# Patient Record
Sex: MALE | Race: WHITE
[De-identification: names, ages, dates, MRNs, and addresses within clinical notes are randomized per-mention and may not be internally consistent; named-entity substitution may affect disease eponyms.]

---

## 2018-04-25 ENCOUNTER — HOSPITAL ENCOUNTER (INPATIENT)
Dept: HOSPITAL 74 - YASAS | Age: 38
LOS: 4 days | Discharge: HOME | DRG: 773 | End: 2018-04-29
Attending: INTERNAL MEDICINE | Admitting: INTERNAL MEDICINE
Payer: COMMERCIAL

## 2018-04-25 VITALS — BODY MASS INDEX: 41.1 KG/M2

## 2018-04-25 DIAGNOSIS — F32.9: ICD-10-CM

## 2018-04-25 DIAGNOSIS — F10.230: Primary | ICD-10-CM

## 2018-04-25 DIAGNOSIS — E86.0: ICD-10-CM

## 2018-04-25 DIAGNOSIS — F17.210: ICD-10-CM

## 2018-04-25 DIAGNOSIS — F51.05: ICD-10-CM

## 2018-04-25 DIAGNOSIS — F11.20: ICD-10-CM

## 2018-04-25 DIAGNOSIS — F41.9: ICD-10-CM

## 2018-04-25 DIAGNOSIS — F13.230: ICD-10-CM

## 2018-04-25 DIAGNOSIS — G47.00: ICD-10-CM

## 2018-04-25 DIAGNOSIS — F19.282: ICD-10-CM

## 2018-04-25 DIAGNOSIS — E66.9: ICD-10-CM

## 2018-04-25 DIAGNOSIS — F19.24: ICD-10-CM

## 2018-04-25 LAB
APPEARANCE UR: CLEAR
BILIRUB UR STRIP.AUTO-MCNC: NEGATIVE MG/DL
COLOR UR: YELLOW
EPITH CASTS URNS QL MICRO: (no result) /HPF
KETONES UR QL STRIP: NEGATIVE
LEUKOCYTE ESTERASE UR QL STRIP.AUTO: (no result)
MUCOUS THREADS URNS QL MICRO: (no result)
NITRITE UR QL STRIP: NEGATIVE
PH UR: 7 [PH] (ref 5–8)
PROT UR QL STRIP: NEGATIVE
PROT UR QL STRIP: NEGATIVE
SP GR UR: 1.02 (ref 1–1.03)
UROBILINOGEN UR STRIP-MCNC: 2 MG/DL (ref 0.2–1)

## 2018-04-25 PROCEDURE — HZ2ZZZZ DETOXIFICATION SERVICES FOR SUBSTANCE ABUSE TREATMENT: ICD-10-PCS | Performed by: SURGERY

## 2018-04-25 PROCEDURE — G0009 ADMIN PNEUMOCOCCAL VACCINE: HCPCS

## 2018-04-25 PROCEDURE — G0008 ADMIN INFLUENZA VIRUS VAC: HCPCS

## 2018-04-25 RX ADMIN — Medication PRN MG: at 22:30

## 2018-04-25 RX ADMIN — NICOTINE SCH: 14 PATCH, EXTENDED RELEASE TRANSDERMAL at 13:25

## 2018-04-25 RX ADMIN — Medication SCH MG: at 22:31

## 2018-04-25 RX ADMIN — TRAZODONE HYDROCHLORIDE SCH MG: 50 TABLET ORAL at 22:30

## 2018-04-25 RX ADMIN — METHADONE HYDROCHLORIDE SCH MG: 40 TABLET ORAL at 13:19

## 2018-04-25 NOTE — HP
CIWA Score





- CIWA Score


Nausea/Vomiting: 3


Muscle Tremors: 3


Anxiety: 3


Agitation: 3


Paroxysmal Sweats: 3


Orientation: 0-Oriented


Tacttile Disturbances: 0-None


Auditory Disturbances: 0-None


Visual Disturbances: 0-None


Headache: 0-None Present


CIWA-Ar Total Score: 15





Admission ROS S





- HPI


Chief Complaint: 





alcohol and benzodiazepine withdrawal sx


Allergies/Adverse Reactions: 


 Allergies











Allergy/AdvReac Type Severity Reaction Status Date / Time


 


No Known Allergies Allergy   Verified 18 09:21











History of Present Illness: 





36 yo m with h/o OUD, severe, on OTP methadone 100mg daily, ldm yesterday , 

dose verified by nurse requesting inaptietn detoxifciation from alcoho and 

benzodiazepines beasue of withdrwal sx.  denies seizures, DTs, o si at this 

time.  PMHX anxiety, depression, insomnia, thirsty, 


Exam Limitations: No Limitations





- Ebola screening


Have you traveled outside of the country in the last 21 days: No


Have you had contact with anyone from an Ebola affected area: No


Have you been sick,other than usual withdrawal symptoms: No





- Review of Systems


Constitutional: Chills, Diaphoresis, Night Sweats, Changes in sleep, 

Unintentional Wgt. Loss


EENT: reports: No Symptoms Reported


Respiratory: reports: No Symptoms reported


Cardiac: reports: No Symptoms Reported


GI: reports: Nausea, Poor Appetite, Poor Fluid Intake, Vomiting, Indigestion, 

Abdominal cramping


: reports: No Symptoms Reported


Musculoskeletal: reports: No Symptoms Reported


Integumentary: reports: Flushing, Sweating


Neuro: reports: Headache, Numbness, Tingling, Tremors


Endocrine: reports: Increased Thirst


Hematology: reports: No Symptoms Reported


Psychiatric: reports: Judgement Intact, Mood/Affect Appropiate, Orientated x3, 

Anxious, Depressed


Other Systems: Reviewed and Negative





Patient History





- Patient Medical History


Hx Anemia: No


Hx Asthma: No


Hx Chronic Obstructive Pulmonary Disease (COPD): No


Hx Cancer: No


Hx Cardiac Disorders: No


Hx Congestive Heart Failure: No


Hx Hypertension: No


Hx Hypercholesterolemia: No


Hx Pacemaker: No


HX Cerebrovascular Accident: No


Hx Seizures: No


Hx Dementia: No


Hx Diabetes: No


Hx Gastrointestinal Disorders: No


Hx Liver Disease: No


Hx Genitourinary Disorders: No


Hx Sexually Transmitted Disorders: No


Hx Renal Disease (ESRD): No


Hx Thyroid Disease: No


Hx Human Immunodeficiency Virus (HIV): No


Hx Hepatitis C: No


Hx Depression: No


Hx Suicide Attempt: No (no si at htis time)


Hx Bipolar Disorder: No


Hx Schizophrenia: No


Other Medical History: takes trazadone for sleep





- Patient Surgical History


Past Surgical History: Yes


Hx Neurologic Surgery: No


Hx Cataract Extraction: No


Hx Cardiac Surgery: No


Hx Lung Surgery: No


Hx Breast Surgery: No


Hx Breast Biopsy: No


Hx Abdominal Surgery: No


Hx Appendectomy: Yes ()


Hx Cholecystectomy: No


Hx Genitourinary Surgery: No


Hx  Section: No


Hx Orthopedic Surgery: No


Anesthesia Reaction: No





- PPD History


Previous Implant?: Yes


Documented Results: Negative w/proof


Date: 11


Results: negative


PPD to be Administered?: Yes





- Reproductive History


Patient is a Female of Child Bearing Age (11 -55 yrs old): No


Patient Pregnant: No





- Smoking Cessation


Smoking history: Current every day smoker


Aproximately how many cigarettes per day: 10


Cigars Per Day: 0


Hx Chewing Tobacco Use: No


Initiated information on smoking cessation: Yes


'Breaking Loose' booklet given: 18





- Substance & Tx. History


Hx Alcohol Use: Yes


Hx Substance Use: Yes


Substance Use Type: Alcohol, Cocaine, Heroin, Opiates, Prescribed, Tranquilizers


Hx Substance Use Treatment: Yes (otp)





- Substances Abused


  ** Heroin


Route: Inhalation


Frequency: Daily


Amount used: 5 BAGS


Age of first use: 18


Date of Last Use: 18





  ** Alcohol


Route: Oral


Frequency: Daily


Amount used: 7 BEER 24 OZ


Age of first use: 22


Date of Last Use: 18





  ** Benzodiazepine (Klonopin)


Route: Oral


Frequency: Daily


Amount used: 7mg daily clonazepam daily


Age of first use: 18


Date of Last Use: 18





  ** Cocaine


Route: Inhalation


Frequency: 1-3 times last 30 days


Amount used: $150


Age of first use: 26


Date of Last Use: 18





Family Disease History





- Family Disease History


Family History: Denies





Admission Physical Exam [facility]





- Vital Signs


Vital Signs: 


 Vital Signs - 24 hr











  18





  08:59


 


Temperature 96.6 F L


 


Pulse Rate 86


 


Respiratory 20





Rate 


 


Blood Pressure 136/98














- Physical


General Appearance: Yes: Nourished, Appropriately Dressed, Disheveled, Mild 

Distress, Moderate Distress, Obese, Tremorous, Irritable, Sweating, Anxious


HEENTM: Yes: Within Normal Limits, EOMI, Hearing grossly Normal, Normal ENT 

Inspection, Normocephalic, Normal Voice, HITESH, Pharynx Normal, Nasal Congestion

, Rhinorrhea


Respiratory: Yes: Within Normal Limits, Chest Non-Tender, Lungs Clear, Normal 

Breath Sounds, No Respiratory Distress, No Accessory Muscle Use


Neck: Yes: Within Normal Limits, No masses,lesions,Nodules, Supple, Trachea in 

good position


Breast: Yes: Breast Exam Deferred


Cardiology: Yes: Within Normal Limits, Regular Rhythm, Regular Rate, S1, S2


Abdominal: Yes: Within Normal Limits, Normal Bowel Sounds, Non Tender, Soft, 

Increased Bowel Sounds, Protuberent, Distended


Genitourinary: Yes: Within Normal Limits


Back: Yes: Within Normal Limits, Normal Inspection


Musculoskeletal: Yes: Within Normal Limits, full range of Motion, Gait Steady, 

Pelvis Stable


Extremities: Yes: Normal Capillary Refill, Normal Range of Motion, Non-Tender, 

Tremors


Neurological: Yes: CNs II-XII NML intact, Fully Oriented, Alert, Motor Strength 

5/5, Normal Response, Depressed Affect


Integumentary: Yes: Normal Color, Warm, Diaphoresis, Moist


Lymphatic: Yes: Within Normal Limits





- Addiitonal


Findings: 





withdrawal sx





- Diagnostic


(1) Alcohol dependence with uncomplicated withdrawal


Current Visit: Yes   Status: Acute   





(2) Sedative, hypnotic or anxiolytic dependence with withdrawal, uncomplicated


Current Visit: Yes   Status: Acute   





(3) Opioid dependence on agonist therapy


Current Visit: Yes   Status: Acute   





(4) Insomnia


Current Visit: Yes   Status: Acute   





(5) Dehydration


Current Visit: Yes   Status: Acute   





(6) Anxiety


Current Visit: Yes   Status: Acute   





(7) Depression


Current Visit: Yes   Status: Acute   





(8) Obesity


Current Visit: Yes   Status: Acute   





Cleared for Admission [facility]





- Detox or Rehab


[facility] Level of Care: Medically Managed


Detox Regimen/Protocol: Librium





[facility] Breath Alcohol Content


Breath Alcohol Content: 0





Urine Drug Screen





- Results


Drug Screen Negative: No


Urine Drug Screen Results: BZO-Benzodiazepines, MTD-Methadone, TCA-Tricyclic 

Antidepress

## 2018-04-25 NOTE — CONSULT
[facility] Psychiatric Consult





- Data


Date of interview: 04/25/18


[facility]


Identifying data: Patient is a 37 year old [place]er male, father of six, 

unemployed, receiving food stamps, and currently homeless. This is one of 

multiple admissions for patient. Pt. admitted to  detox for alcohol, 

benzodiazepine and opiate dependence.


Substance Abuse History: Following information confirmed with Mr. Rizo:  

Smoking Cessation.  Smoking history: Current every day smoker.  Aproximately 

how many cigarettes per day: 10.  Cigars Per Day: 0.  Hx Chewing Tobacco Use: 

No.  Initiated information on smoking cessation: Yes.  'Breaking Loose' booklet 

given: 04/25/18.  - Substance & Tx. History.  Hx Alcohol Use: Yes.  Hx 

Substance Use: Yes.  Substance Use Type: Alcohol, Cocaine, Heroin, Opiates, 

Prescribed, Tranquilizers.  Hx Substance Use Treatment: Yes (otp).  - 

Substances Abused.  ** Heroin.  Route: Inhalation.  Frequency: Daily.  Amount 

used: 5 BAGS.  Age of first use: 18.  Date of Last Use: 04/21/18.  ** Alcohol.  

Route: Oral.  Frequency: Daily.  Amount used: 7 BEER 24 OZ.  Age of first use: 

22.  Date of Last Use: 04/24/18.  ** Benzodiazepine (Klonopin).  Route: Oral.  

Frequency: Daily.  Amount used: 7mg daily clonazepam daily.  Age of first use: 

18.  Date of Last Use: 04/24/18.  ** Cocaine.  Route: Inhalation.  Frequency: 1-

3 times last 30 days.  Amount used: $150.  Age of first use: 26.  Date of Last 

Use: 04/11/18


Medical History: Denies.


Psychiatric History: Patient's first encounter with a psychiatrist was in 2015 

after admission to [facility] psychiatric unit for suicidal ideation. 

Outpatient care is currently provided at the [facility] in the [place]. 

Pt is prescribed buspar 15mg BID +Trazodone 50mg + Abilify 10mg (nonadherence 

to abilify). Pt. reports a diagnosis of bipolar disorder. Pt. denies h/o 

suicide attempt.


Physical/Sexual Abuse/Trauma History: Denies.





Mental Status Exam





- Mental Status Exam


Alert and Oriented to: Time, Place, Person


Cognitive Function: Good


Patient Appearance: Well Groomed


Mood: Hopeful


Affect: Mood Congruent


Patient Behavior: Cooperative


Speech Pattern: Appropriate


Voice Loudness: Normal


Thought Process: Goal Oriented


Thought Disorder: Not Present


Hallucinations: Denies


Suicidal Ideation: Denies


Homicidal Ideation: Denies


Insight/Judgement: Poor


Sleep: Poorly


Appetite: Fair


Muscle strength/Tone: Normal


Gait/Station: Normal





Psychiatric Findings





- Problem List (Axis 1, 2,3)


(1) Substance induced mood disorder


Current Visit: Yes   Status: Acute   





(2) Alcohol dependence with uncomplicated withdrawal


Current Visit: Yes   Status: Acute   





(3) Opioid dependence on agonist therapy


Current Visit: Yes   Status: Acute   





(4) Sedative, hypnotic or anxiolytic dependence with withdrawal, uncomplicated


Current Visit: Yes   Status: Acute   





(5) Substance-induced sleep disorder


Current Visit: Yes   Status: Acute   





- Initial Treatment Plan


Initial Treatment Plan: Psychoeducation provided. Detoxification in progress. 

Buspar 15mg BID + trazodone 50mg qhs ordered. Benefits and side effects 

discusssed. Pt. made aware of the risk of priapism when accepting trazodone. 

Verbal consent given. Will continue to monitor.

## 2018-04-25 NOTE — EKG
Test Reason : 

Blood Pressure : ***/*** mmHG

Vent. Rate : 073 BPM     Atrial Rate : 073 BPM

   P-R Int : 142 ms          QRS Dur : 082 ms

    QT Int : 436 ms       P-R-T Axes : 053 037 045 degrees

   QTc Int : 480 ms

 

NORMAL SINUS RHYTHM

PROLONGED QT

ABNORMAL ECG

NO PREVIOUS ECGS AVAILABLE

Confirmed by NASH FRAGOSO, LO ([id number]) on 4/25/2018 3:31:45 PM

 

Referred By:             Confirmed By:LO CAO MD

## 2018-04-26 LAB
ALBUMIN SERPL-MCNC: 3.5 G/DL (ref 3.4–5)
ALP SERPL-CCNC: 147 U/L (ref 45–117)
ALT SERPL-CCNC: 49 U/L (ref 12–78)
ANION GAP SERPL CALC-SCNC: 5 MMOL/L (ref 8–16)
AST SERPL-CCNC: 28 U/L (ref 15–37)
BILIRUB SERPL-MCNC: 0.4 MG/DL (ref 0.2–1)
BUN SERPL-MCNC: 8 MG/DL (ref 7–18)
CALCIUM SERPL-MCNC: 8.7 MG/DL (ref 8.5–10.1)
CHLORIDE SERPL-SCNC: 106 MMOL/L (ref 98–107)
CO2 SERPL-SCNC: 30 MMOL/L (ref 21–32)
CREAT SERPL-MCNC: 0.8 MG/DL (ref 0.7–1.3)
DEPRECATED RDW RBC AUTO: 14.5 % (ref 11.9–15.9)
GLUCOSE SERPL-MCNC: 80 MG/DL (ref 74–106)
HCT VFR BLD CALC: 43.2 % (ref 35.4–49)
HGB BLD-MCNC: 14.5 GM/DL (ref 11.7–16.9)
MCH RBC QN AUTO: 30.3 PG (ref 25.7–33.7)
MCHC RBC AUTO-ENTMCNC: 33.7 G/DL (ref 32–35.9)
MCV RBC: 90.1 FL (ref 80–96)
PLATELET # BLD AUTO: 247 K/MM3 (ref 134–434)
PMV BLD: 10.4 FL (ref 7.5–11.1)
POTASSIUM SERPLBLD-SCNC: 4.5 MMOL/L (ref 3.5–5.1)
PROT SERPL-MCNC: 6.8 G/DL (ref 6.4–8.2)
RBC # BLD AUTO: 4.79 M/MM3 (ref 4–5.6)
SODIUM SERPL-SCNC: 141 MMOL/L (ref 136–145)
WBC # BLD AUTO: 7.3 K/MM3 (ref 4–10)

## 2018-04-26 RX ADMIN — IBUPROFEN PRN MG: 400 TABLET, FILM COATED ORAL at 22:22

## 2018-04-26 RX ADMIN — METHADONE HYDROCHLORIDE SCH MG: 40 TABLET ORAL at 05:29

## 2018-04-26 RX ADMIN — Medication SCH TAB: at 10:39

## 2018-04-26 RX ADMIN — Medication PRN MG: at 22:21

## 2018-04-26 RX ADMIN — TRAZODONE HYDROCHLORIDE SCH MG: 50 TABLET ORAL at 22:20

## 2018-04-26 RX ADMIN — NICOTINE SCH MG: 14 PATCH, EXTENDED RELEASE TRANSDERMAL at 10:40

## 2018-04-26 RX ADMIN — Medication SCH MG: at 22:21

## 2018-04-26 NOTE — PN
S CIWA





- CIWA Score


Nausea/Vomiting: 3


Muscle Tremors: 3


Anxiety: 3


Agitation: 3


Paroxysmal Sweats: 1-Minimal Palms Moist


Orientation: 0-Oriented


Tacttile Disturbances: 1-Very Mild Itch/Numbness


Auditory Disturbances: 1-Very Mild


Visual Disturbances: 0-None


Headache: 2-Mild


CIWA-Ar Total Score: 17





[facility] Progress Note (SOAP)


Subjective: 





ALERT,IRRITABLE,ANXIOUS,INTERRUPTED SLEEP,TREMOR,PAIN IN THE BODY AND BACK


Objective: 





04/26/18 10:27


 Vital Signs











Temperature  97.7 F   04/26/18 10:03


 


Pulse Rate  67   04/26/18 10:03


 


Respiratory Rate  16   04/26/18 10:03


 


Blood Pressure  121/69   04/26/18 10:03


 


O2 Sat by Pulse Oximetry (%)      








EKG NSR,PROLOMG /480


NO CHEST PAIN,NO SOB,NO DIZZINESS


 Laboratory Last Values











WBC  7.3 K/mm3 (4.0-10.0)   04/26/18  05:50    


 


RBC  4.79 M/mm3 (4.00-5.60)   04/26/18  05:50    


 


Hgb  14.5 GM/dL (11.7-16.9)   04/26/18  05:50    


 


Hct  43.2 % (35.4-49)   04/26/18  05:50    


 


MCV  90.1 fl (80-96)   04/26/18  05:50    


 


MCH  30.3 pg (25.7-33.7)   04/26/18  05:50    


 


MCHC  33.7 g/dl (32.0-35.9)   04/26/18  05:50    


 


RDW  14.5 % (11.9-15.9)   04/26/18  05:50    


 


Plt Count  247 K/MM3 (134-434)   04/26/18  05:50    


 


MPV  10.4 fl (7.5-11.1)   04/26/18  05:50    


 


Urine Color  Yellow   04/25/18  21:11    


 


Urine Appearance  Clear   04/25/18  21:11    


 


Urine pH  7.0  (5.0-8.0)  D 04/25/18  21:11    


 


Ur Specific Gravity  1.024  (1.001-1.035)   04/25/18  21:11    


 


Urine Protein  Negative  (NEGATIVE)   04/25/18  21:11    


 


Urine Glucose (UA)  Negative  (NEGATIVE)   04/25/18  21:11    


 


Urine Ketones  Negative  (NEGATIVE)   04/25/18  21:11    


 


Urine Blood  Negative  (NEGATIVE)   04/25/18  21:11    


 


Urine Nitrite  Negative  (NEGATIVE)   04/25/18  21:11    


 


Urine Bilirubin  Negative  (<2.0 mg/dL)   04/25/18  21:11    


 


Urine Urobilinogen  2.0 mg/dL (0.2-1.0)   04/25/18  21:11    


 


Ur Leukocyte Esterase  Trace  (NEGATIVE)   04/25/18  21:11    


 


Urine WBC (Auto)  <1 /hpf (3-5)   04/25/18  21:11    


 


Urine RBC (Auto)  <1 /hpf (0-3)   04/25/18  21:11    


 


Ur Epithelial Cells  Rare /HPF (FEW)   04/25/18  21:11    


 


Urine Mucus  Rare   04/25/18  21:11    


 


HIV 1&2 Antibody Screen  Negative   04/25/18  12:20    


 


HIV P24 Antigen  Negative   04/25/18  12:20    











04/26/18 10:30


LABS PENDING


Assessment: 





04/26/18 10:31


WITHDRAWAL SYMPTOM


Plan: 





CONTINUE DETOX

## 2018-04-27 RX ADMIN — NICOTINE SCH MG: 14 PATCH, EXTENDED RELEASE TRANSDERMAL at 10:09

## 2018-04-27 RX ADMIN — TRAZODONE HYDROCHLORIDE SCH MG: 50 TABLET ORAL at 22:14

## 2018-04-27 RX ADMIN — GABAPENTIN SCH MG: 300 CAPSULE ORAL at 22:14

## 2018-04-27 RX ADMIN — METHADONE HYDROCHLORIDE SCH MG: 40 TABLET ORAL at 05:09

## 2018-04-27 RX ADMIN — Medication SCH MG: at 22:14

## 2018-04-27 RX ADMIN — IBUPROFEN PRN MG: 400 TABLET, FILM COATED ORAL at 16:37

## 2018-04-27 RX ADMIN — Medication SCH TAB: at 10:08

## 2018-04-27 NOTE — PN
[facility] CIWA





- CIWA Score


Nausea/Vomiting: 3


Muscle Tremors: 3


Anxiety: 3


Agitation: 1-Slight > Activity


Paroxysmal Sweats: 1-Minimal Palms Moist


Orientation: 0-Oriented


Tacttile Disturbances: 0-None


Auditory Disturbances: 0-None


Visual Disturbances: 0-None


Headache: 0-None Present


CIWA-Ar Total Score: 11





[facility] Progress Note (SOAP)


Subjective: 





nausea, anxiety, insomnia, interrupted sleep, sweats


Objective: 





04/27/18 09:52


 Vital Signs - 24 hr











  04/26/18 04/26/18 04/26/18





  10:03 13:40 18:07


 


Temperature 97.7 F 97.9 F 98.4 F


 


Pulse Rate 67 71 85


 


Respiratory 16 16 18





Rate   


 


Blood Pressure 121/69 120/56 149/79














  04/26/18 04/27/18 04/27/18





  22:27 00:30 03:30


 


Temperature 97.9 F  


 


Pulse Rate 70  


 


Respiratory 18 20 20





Rate   


 


Blood Pressure 133/70  














  04/27/18





  06:00


 


Temperature 97.3 F L


 


Pulse Rate 69


 


Respiratory 18





Rate 


 


Blood Pressure 117/76








 Laboratory Tests











  04/25/18 04/25/18 04/26/18





  12:20 21:11 05:50


 


WBC    7.3


 


RBC    4.79


 


Hgb    14.5


 


Hct    43.2


 


MCV    90.1


 


MCH    30.3


 


MCHC    33.7


 


RDW    14.5


 


Plt Count    247


 


MPV    10.4


 


Sodium   


 


Potassium   


 


Chloride   


 


Carbon Dioxide   


 


Anion Gap   


 


BUN   


 


Creatinine   


 


Creat Clearance w eGFR   


 


Random Glucose   


 


Calcium   


 


Total Bilirubin   


 


AST   


 


ALT   


 


Alkaline Phosphatase   


 


Total Protein   


 


Albumin   


 


Urine Color   Yellow 


 


Urine Appearance   Clear 


 


Urine pH   7.0  D 


 


Ur Specific Gravity   1.024 


 


Urine Protein   Negative 


 


Urine Glucose (UA)   Negative 


 


Urine Ketones   Negative 


 


Urine Blood   Negative 


 


Urine Nitrite   Negative 


 


Urine Bilirubin   Negative 


 


Urine Urobilinogen   2.0 


 


Ur Leukocyte Esterase   Trace 


 


Urine WBC (Auto)   <1 


 


Urine RBC (Auto)   <1 


 


Ur Epithelial Cells   Rare 


 


Urine Mucus   Rare 


 


RPR Titer   


 


HIV 1&2 Antibody Screen  Negative  


 


HIV P24 Antigen  Negative  














  04/26/18 04/26/18





  05:50 05:50


 


WBC  


 


RBC  


 


Hgb  


 


Hct  


 


MCV  


 


MCH  


 


MCHC  


 


RDW  


 


Plt Count  


 


MPV  


 


Sodium  141 


 


Potassium  4.5 


 


Chloride  106 


 


Carbon Dioxide  30 


 


Anion Gap  5 L 


 


BUN  8 


 


Creatinine  0.8 


 


Creat Clearance w eGFR  > 60 


 


Random Glucose  80 


 


Calcium  8.7 


 


Total Bilirubin  0.4 


 


AST  28 


 


ALT  49 


 


Alkaline Phosphatase  147 H 


 


Total Protein  6.8 


 


Albumin  3.5 


 


Urine Color  


 


Urine Appearance  


 


Urine pH  


 


Ur Specific Gravity  


 


Urine Protein  


 


Urine Glucose (UA)  


 


Urine Ketones  


 


Urine Blood  


 


Urine Nitrite  


 


Urine Bilirubin  


 


Urine Urobilinogen  


 


Ur Leukocyte Esterase  


 


Urine WBC (Auto)  


 


Urine RBC (Auto)  


 


Ur Epithelial Cells  


 


Urine Mucus  


 


RPR Titer   Nonreactive


 


HIV 1&2 Antibody Screen  


 


HIV P24 Antigen  











Assessment: 





04/27/18 09:52


withdrawal sx - cont detox, fluids, encourage ambulation

## 2018-04-27 NOTE — PN
Psychiatric Progress Note


Vital Signs: 


 Vital Signs











 Period  Temp  Pulse  Resp  BP Sys/Stevens  Pulse Ox


 


 Last 24 Hr  97.3 F-98.4 F  69-85  16-20  117-149/56-79  











Date of Session: 04/27/18


Chief Complaint:: "Can i resume my anxiety medication."


HPI: Pt. admitted to [facility] detox for alcohol, benzodiazepine and opiate dependence.


ROS: Unremarkable


Current Medications: 


Active Medications











Generic Name Dose Route Start Last Admin





  Trade Name Freq  PRN Reason Stop Dose Admin


 


Acetaminophen  650 mg  04/25/18 11:51  





  Tylenol -  PO   





  Q4H PRN   





  FEVER   


 


Al Hydroxide/Mg Hydroxide  30 ml  04/25/18 11:51  04/26/18 18:00





  Mylanta Oral Suspension -  PO   30 ml





  Q6H PRN   Administration





  DYSPEPSIA   


 


Buspirone HCl  15 mg  04/25/18 22:00  04/27/18 10:08





  Buspar -  PO   15 mg





  BID LINSEY   Administration


 


Chlordiazepoxide HCl  15 mg  04/27/18 17:00  





  Librium -  PO  04/28/18 11:01  





  [id number] LINSEY   


 


Chlordiazepoxide HCl  25 mg  04/25/18 11:51  04/26/18 12:24





  Librium -  PO  04/28/18 11:50  25 mg





  Q4H PRN   Administration





  WITHDRAWAL(CONT SUBST)   


 


Chlordiazepoxide HCl  10 mg  04/28/18 17:00  





  Librium -  PO  04/29/18 11:01  





  L2T-LDU LINSEY   


 


Eucalyptus/Menthol/Phenol/Sorbitol  1 each  04/25/18 11:51  





  Cepastat Lozenge -  MM   





  Q4H PRN   





  SORE THROAT   


 


Gabapentin  300 mg  04/27/18 22:00  





  Neurontin -  PO   





  BID LINSEY   


 


Guaifenesin  10 ml  04/25/18 11:51  





  Robitussin Dm -  PO   





  Q6H PRN   





  COUGH   


 


Hydroxyzine Pamoate  50 mg  04/25/18 11:51  04/26/18 10:39





  Vistaril -  PO   50 mg





  Q4H PRN   Administration





  AGITATION   


 


Ibuprofen  400 mg  04/25/18 11:51  04/26/18 22:22





  Motrin -  PO   400 mg





  Q6H PRN   Administration





  PAIN LEVEL 4-6   


 


Loperamide HCl  4 mg  04/25/18 11:51  





  Imodium -  PO   





  Q6H PRN   





  DIARRHEA   


 


Magnesium Citrate  300 ml  04/25/18 11:51  





  Citroma -  PO   





  Q48H PRN   





  CONSTIPATION   


 


Magnesium Hydroxide  30 ml  04/25/18 11:51  





  Milk Of Magnesia -  PO   





  DAILY PRN   





  CONSTIPATION   


 


Melatonin  5 mg  04/25/18 22:00  04/26/18 22:21





  Melatonin  PO   5 mg





  HS PRN   Administration





  INSOMNIA   


 


Methadone HCl 80 mg/ Methadone  100 mg  04/25/18 12:50  04/27/18 05:09





  HCl 20 mg  PO  05/02/18 12:49  100 mg





  DAILY@0600 LINSEY   Administration


 


Nicotine  14 mg  04/25/18 12:45  04/27/18 10:09





  Nicoderm Patch -  TD   14 mg





  DAILY LINSEY   Administration


 


Nicotine Polacrilex  2 mg  04/25/18 11:51  





  Nicorette Gum -  BUC   





  Q2H PRN   





  NICOTINE REPLACEMENT RX   


 


Prenatal Multivit/Folic Acid/Iron  1 tab  04/26/18 10:00  04/27/18 10:08





  Prenatal Vitamins (Sjr) -  PO   1 tab





  DAILY LINSEY   Administration


 


Pseudoephedrine/Triprolidine  1 combo  04/25/18 11:51  





  Actifed -  PO   





  TID PRN   





  NASAL CONGESTION   


 


Thiamine HCl  100 mg  04/25/18 22:00  04/26/18 22:21





  Vitamin B1 -  PO   100 mg





  HS LINSEY   Administration


 


Trazodone HCl  50 mg  04/25/18 22:00  04/26/18 22:20





  Desyrel -  PO   50 mg





  HS LINSEY   Administration











Medication(s) Change(s): Yes. Will add Gabapentin 300mg BID


Current Side Effect: No


Lab tests ordered: No


Lab tests reviewed: Yes


Provider note:: Patient requesting to resume his gabapentin 300mg BID. Pt. 

reports most recently taking gabapetin approximately three weeks ago. Pt. with 

a history of bipolar disorder and is currently prescribed buspar 15mg BID and 

Trazodone 50mg qhs. Outpatient care is currently provided at the [facility]. Gabapentin 300mg to be started tonight. Psychoeducation provided. Pt. 

agreeable with plan. Will continue to monitor.


Total face to face time:: 25





Mental Status Exam





- Mental Status Exam


Alert and Oriented to: Time, Place, Person


Cognitive Function: Good


Patient Appearance: Well Groomed


Mood: Euthymic


Affect: Mood Congruent


Patient Behavior: Appropriate, Cooperative


Speech Pattern: Clear, Appropriate


Voice Loudness: Normal


Thought Process: Goal Oriented


Thought Disorder: Not Present


Hallucinations: Denies


Suicidal Ideation: Denies


Homicidal Ideation: Denies


Insight/Judgement: Poor


Sleep: Fair


Appetite: Fair


Muscle strength/Tone: Normal


Gait/Station: Normal





Psychiatric Treatment Plan





- Problem List


(1) Substance induced mood disorder


Current Visit: Yes   





(2) Alcohol dependence with uncomplicated withdrawal


Current Visit: Yes   





(3) Opioid dependence on agonist therapy


Current Visit: Yes   





(4) Sedative, hypnotic or anxiolytic dependence with withdrawal, uncomplicated


Current Visit: Yes   





(5) Substance-induced sleep disorder


Current Visit: Yes

## 2018-04-28 RX ADMIN — METHADONE HYDROCHLORIDE SCH MG: 40 TABLET ORAL at 05:31

## 2018-04-28 RX ADMIN — Medication SCH TAB: at 10:05

## 2018-04-28 RX ADMIN — NICOTINE SCH MG: 14 PATCH, EXTENDED RELEASE TRANSDERMAL at 10:05

## 2018-04-28 RX ADMIN — GABAPENTIN SCH MG: 300 CAPSULE ORAL at 22:06

## 2018-04-28 RX ADMIN — ACETAMINOPHEN PRN MG: 325 TABLET ORAL at 13:33

## 2018-04-28 RX ADMIN — ACETAMINOPHEN PRN MG: 325 TABLET ORAL at 05:33

## 2018-04-28 RX ADMIN — TRAZODONE HYDROCHLORIDE SCH MG: 50 TABLET ORAL at 22:06

## 2018-04-28 RX ADMIN — GABAPENTIN SCH MG: 300 CAPSULE ORAL at 10:05

## 2018-04-28 RX ADMIN — Medication SCH MG: at 22:06

## 2018-04-28 NOTE — PN
[facility] Progress Note (SOAP)


Subjective: 





ALERT,IRRITABLE,INTERRUPTED SLEEP


Objective: 





04/28/18 13:23


 Vital Signs











Temperature  97.5 F L  04/28/18 10:00


 


Pulse Rate  84   04/28/18 10:00


 


Respiratory Rate  18   04/28/18 10:00


 


Blood Pressure  113/67   04/28/18 10:00


 


O2 Sat by Pulse Oximetry (%)      











Assessment: 





04/28/18 13:23


WITHDRAWAL SYMPTOM


Plan: 





CONTINUE DETOX,DISCHARGE IN AM

## 2018-04-28 NOTE — PN
[facility]HS Progress Note


Note: 





patient complained of chest pain


 Vital Signs











Temperature  98.1 F   04/28/18 14:40


 


Pulse Rate  75   04/28/18 14:40


 


Respiratory Rate  20   04/28/18 14:40


 


Blood Pressure  124/66   04/28/18 14:40


 


O2 Sat by Pulse Oximetry (%)      








pulse ox 100%


ekg nsr,normal ecg,prolong qt


not in any distress


pain subsided


treatment close monitoring

## 2018-04-29 VITALS — SYSTOLIC BLOOD PRESSURE: 129 MMHG | DIASTOLIC BLOOD PRESSURE: 94 MMHG | HEART RATE: 69 BPM | TEMPERATURE: 97.5 F

## 2018-04-29 RX ADMIN — GABAPENTIN SCH MG: 300 CAPSULE ORAL at 09:37

## 2018-04-29 RX ADMIN — METHADONE HYDROCHLORIDE SCH MG: 40 TABLET ORAL at 05:19

## 2018-04-29 RX ADMIN — NICOTINE SCH: 14 PATCH, EXTENDED RELEASE TRANSDERMAL at 09:37

## 2018-04-29 RX ADMIN — Medication SCH: at 09:37

## 2018-04-29 NOTE — DS
[facility] Detox Discharge Summary


Admission Date: 


04/25/18





Discharge Date: 04/29/18





- History


Present History: Alcohol Dependence, Opioid Dependence, Sedative Dependence





- Physical Exam Results


Vital Signs: 


 Vital Signs











Temperature  97.5 F L  04/29/18 07:16


 


Pulse Rate  69   04/29/18 07:16


 


Respiratory Rate  18   04/29/18 07:16


 


Blood Pressure  129/94   04/29/18 07:16


 


O2 Sat by Pulse Oximetry (%)      














- Treatment


Hospital Course: Detox Protocol Followed, Detoxed Safely, Responded well, 

Discharged Condition Good, Rehab Referral Accepted





- Medication


Discharge Medications: 


Ambulatory Orders





Buspirone HCl [Buspar -] 15 mg PO BID 04/25/18 


Trazodone HCl 50 mg PO HS 04/25/18 


Gabapentin 300 mg PO BID 04/27/18 











- Diagnosis


(1) Alcohol dependence with uncomplicated withdrawal


Current Visit: Yes   Status: Chronic   





(2) Anxiety


Current Visit: Yes   Status: Acute   





(3) Dehydration


Current Visit: Yes   Status: Acute   





(4) Depression


Current Visit: Yes   Status: Acute   





(5) Insomnia


Current Visit: Yes   Status: Acute   





(6) Insomnia secondary to depression with anxiety


Current Visit: Yes   Status: Acute   





(7) Obesity


Current Visit: Yes   Status: Acute   





(8) Opioid dependence on agonist therapy


Current Visit: Yes   Status: Acute   





(9) Sedative, hypnotic or anxiolytic dependence with withdrawal, uncomplicated


Current Visit: Yes   Status: Chronic   





(10) Substance induced mood disorder


Current Visit: Yes   Status: Acute   





(11) Substance-induced sleep disorder


Current Visit: Yes   Status: Acute   





- AMA


Did Patient Leave Against Medical Advice: No

## 2018-04-29 NOTE — EKG
Test Reason : 

Blood Pressure : ***/*** mmHG

Vent. Rate : 080 BPM     Atrial Rate : 080 BPM

   P-R Int : 146 ms          QRS Dur : 078 ms

    QT Int : 410 ms       P-R-T Axes : 048 045 045 degrees

   QTc Int : 472 ms

 

NORMAL SINUS RHYTHM

NORMAL ECG

WHEN COMPARED WITH ECG OF 25-APR-2018 13:27,

NO SIGNIFICANT CHANGE WAS FOUND

Confirmed by MACK GAGE MD ([id number]) on 4/29/2018 10:47:02 AM

 

Referred By:             Confirmed By:MACK GAGE MD